# Patient Record
Sex: FEMALE | Race: WHITE | Employment: FULL TIME | URBAN - METROPOLITAN AREA
[De-identification: names, ages, dates, MRNs, and addresses within clinical notes are randomized per-mention and may not be internally consistent; named-entity substitution may affect disease eponyms.]

---

## 2017-01-16 DIAGNOSIS — F41.1 GENERALIZED ANXIETY DISORDER: ICD-10-CM

## 2017-01-16 RX ORDER — CLONAZEPAM 0.5 MG/1
0.5 TABLET ORAL
Qty: 30 TAB | Refills: 0 | Status: SHIPPED | OUTPATIENT
Start: 2017-01-20 | End: 2017-02-17 | Stop reason: ALTCHOICE

## 2017-01-16 NOTE — TELEPHONE ENCOUNTER
She is almost a week early. I will fill this today but it is post-dated to be filled when due, not today.

## 2017-02-13 DIAGNOSIS — F41.1 GENERALIZED ANXIETY DISORDER: ICD-10-CM

## 2017-02-13 RX ORDER — CLONAZEPAM 0.5 MG/1
0.5 TABLET ORAL
Qty: 30 TAB | Refills: 0 | OUTPATIENT
Start: 2017-02-13

## 2017-02-17 ENCOUNTER — OFFICE VISIT (OUTPATIENT)
Dept: INTERNAL MEDICINE CLINIC | Age: 47
End: 2017-02-17

## 2017-02-17 VITALS
BODY MASS INDEX: 24.11 KG/M2 | RESPIRATION RATE: 20 BRPM | HEIGHT: 66 IN | HEART RATE: 78 BPM | WEIGHT: 150 LBS | TEMPERATURE: 97 F | DIASTOLIC BLOOD PRESSURE: 72 MMHG | OXYGEN SATURATION: 98 % | SYSTOLIC BLOOD PRESSURE: 110 MMHG

## 2017-02-17 DIAGNOSIS — E03.9 ACQUIRED HYPOTHYROIDISM: ICD-10-CM

## 2017-02-17 DIAGNOSIS — F41.1 GENERALIZED ANXIETY DISORDER: Primary | ICD-10-CM

## 2017-02-17 RX ORDER — CLONAZEPAM 0.5 MG/1
0.5 TABLET ORAL
Qty: 30 TAB | Refills: 0 | Status: SHIPPED | OUTPATIENT
Start: 2017-02-18 | End: 2017-03-16 | Stop reason: SDUPTHER

## 2017-02-17 RX ORDER — VILAZODONE HYDROCHLORIDE 10 MG/1
10 TABLET ORAL DAILY
Qty: 30 TAB | Refills: 0 | Status: SHIPPED | OUTPATIENT
Start: 2017-02-17 | End: 2017-03-19

## 2017-02-17 NOTE — PROGRESS NOTES
Chief Complaint   Patient presents with    Medication Refill     wants to know what to do to decrease stress in regards to refilling her klonipin. 1. Have you been to the ER, urgent care clinic since your last visit? Hospitalized since your last visit? No    2. Have you seen or consulted any other health care providers outside of the [address] [place] [address] since your last visit? Include any pap smears or colon screening.  No

## 2017-02-17 NOTE — PROGRESS NOTES
Written by Winn Scheuermann, as dictated by Dr. Melvin Celis MD.    Branden Mcghee is a 48 y.o. female. HPI  The patient is here for a klonopin refill. She has called he  for her refill and she called in the office early to get the script prepared. She has followed with a psychologist in the past, but she has never followed with a psychiatrist. She has tried wellbutrin in the past and it did not work well for her. She is a single mom , working full time , under lot of stress. She takes synthroid 50 mcg, but she has not had her TSH levels checked in a while. 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 30 Tab 0    levothyroxine (SYNTHROID) 50 mcg tablet Take 1 Tab by mouth Daily (before breakfast). 30 Tab 6    ferrous sulfate 325 mg (65 mg iron) tablet Take 1 Tab by mouth Daily (before breakfast). 30 Tab 3     No current facility-administered medications on file prior to visit.         No Known Allergies    Past Medical History   Diagnosis Date    Anxiety     Ill-defined condition      Low BP at times    Nausea & vomiting     Psychiatric disorder      anxiety at times    Thyroid disease        Past Surgical History   Procedure Laterality Date    Hx breast reduction  1992    Hx orthopaedic Left Jun 17, 2014     Left Should dislocation, And repair       Family History   Problem Relation Age of Onset    Alcohol abuse Father     Diabetes Father     Hypertension Father     Anxiety Sister     Depression Sister     Diabetes Paternal Grandmother        Social History     Social History    Marital status: LEGALLY      Spouse name: N/A    Number of children: N/A    Years of education: N/A     Occupational History    Not on file. Social History Main Topics    Smoking status: Never Smoker    Smokeless tobacco: Never Used    Alcohol use Yes      Comment: occasional    Drug use: No    Sexual activity: Yes     Partners: Male     Other Topics Concern    Not on file     Social History Narrative           Review of Systems   Constitutional: Negative for malaise/fatigue. HENT: Negative for congestion. Respiratory: Negative for cough and wheezing. Cardiovascular: Negative for chest pain and palpitations. Musculoskeletal: Negative for joint pain and myalgias. Neurological: Negative for weakness and headaches. Psychiatric/Behavioral: Negative for depression and suicidal ideas. The patient is nervous/anxious. Visit Vitals    /72    Pulse 78    Temp 97 °F (36.1 °C) (Oral)    Resp 20    Ht 5' 6\" (1.676 m)    Wt 150 lb (68 kg)    LMP 02/02/2017    SpO2 98%    BMI 24.21 kg/m2     Physical Exam   Constitutional: She is oriented to person, place, and time. She appears well-nourished. No distress. HENT:   Right Ear: External ear normal.   Left Ear: External ear normal.   Mouth/Throat: Oropharynx is clear and moist.   Eyes: Conjunctivae and EOM are normal. Right eye exhibits no discharge. Left eye exhibits no discharge. Neck: Normal range of motion. Neck supple. Cardiovascular: Normal rate and regular rhythm. Pulmonary/Chest: Effort normal and breath sounds normal.   Abdominal: Soft. Bowel sounds are normal.   Neurological: She is alert and oriented to person, place, and time. Skin: Skin is intact. Psychiatric: She has a normal mood and affect. Nursing note and vitals reviewed. ASSESSMENT and PLAN    ICD-10-CM ICD-9-CM    1. Generalized anxiety disorder F41.1 300.02 clonazePAM (KLONOPIN) 0.5 mg tablet script given to patient.        vilazodone (VIIBRYD) 10 mg tab tablet sent to pharmacy      I want her to try Viibryd and I want her to take 10 mg for 5-6 days to try to get off of Klonopin. I will give her a Klonopin refill today. If her viibryd is not working then we will refer her to psychiatrist.   2. Acquired hypothyroidism E03.9 244.9 TSH 3RD GENERATION      LIPID PANEL      METABOLIC PANEL, COMPREHENSIVE      CBC W/O DIFF    I want her to come in on Wednesday fasting so that we can check her TSH, Lipid panel and iron levels. This plan was reviewed with the patient and patient agrees. All questions were answered. This scribe documentation was reviewed by me and accurately reflects the examination and decisions made by me. This note will not be viewable in [address]

## 2017-02-17 NOTE — MR AVS SNAPSHOT
Visit Information Date & Time Provider Department Dept. Phone Encounter #  
 2/17/2017  1:00 PM Vada Bumpers, [address] [facility],[address] Internal Medicine [phone] [id number] Upcoming Health Maintenance Date Due DTaP/Tdap/Td series (1 - Tdap) 12/21/1989 PAP AKA CERVICAL CYTOLOGY 12/21/1989 INFLUENZA AGE 9 TO ADULT 8/1/2016 Allergies as of 2/17/2017  Review Complete On: 2/17/2017 By: Betzy Pan LPN No Known Allergies Current Immunizations  Never Reviewed No immunizations on file. Not reviewed this visit You Were Diagnosed With   
  
 Codes Comments Generalized anxiety disorder    -  Primary ICD-10-CM: F41.1 ICD-9-CM: 300.02 Acquired hypothyroidism     ICD-10-CM: E03.9 ICD-9-CM: 795. 9 Vitals BP Pulse Temp Resp Height(growth percentile) Weight(growth percentile) 110/72 78 97 °F (36.1 °C) (Oral) 20 5' 6\" (1.676 m) 150 lb (68 kg) LMP SpO2 BMI OB Status Smoking Status 02/02/2017 98% 24.21 kg/m2 Having regular periods Never Smoker BMI and BSA Data Body Mass Index Body Surface Area  
 24.21 kg/m 2 1.78 m 2 Preferred Pharmacy Pharmacy Name Phone [id number], [address] [place] [address] [phone] Your Updated Medication List  
  
   
This list is accurate as of: 2/17/17  1:53 PM.  Always use your most recent med list.  
  
  
  
  
 clonazePAM 0.5 mg tablet Commonly known as:  Imlay Osman Take 1 Tab by mouth nightly as needed for up to 30 doses. Max Daily Amount: 0.5 mg.  
Start taking on:  2/18/2017  
  
 ferrous sulfate 325 mg (65 mg iron) tablet Take 1 Tab by mouth Daily (before breakfast). levothyroxine 50 mcg tablet Commonly known as:  SYNTHROID Take 1 Tab by mouth Daily (before breakfast). vilazodone 10 mg Tab tablet Commonly known as:  VIIBRYD Take 1 Tab by mouth daily for 30 days. Prescriptions Printed Refills clonazePAM (KLONOPIN) 0.5 mg tablet 0 Starting on: 2/18/2017 Sig: Take 1 Tab by mouth nightly as needed for up to 30 doses. Max Daily Amount: 0.5 mg.  
 Class: Print Route: Oral  
 vilazodone (VIIBRYD) 10 mg tab tablet 0 Sig: Take 1 Tab by mouth daily for 30 days. Class: Print Route: Oral  
  
To-Do List   
 02/22/2017 Lab:  CBC W/O DIFF   
  
 02/22/2017 Lab:  LIPID PANEL   
  
 02/22/2017 Lab:  METABOLIC PANEL, COMPREHENSIVE   
  
 02/22/2017 Lab:  TSH 3RD GENERATION Introducing [facility]! Drew Green introduces [employer] patient portal. Now you can access parts of your medical record, email your doctor's office, and request medication refills online. 1. In your internet browser, go to [email or web address] 2. Click on the First Time User? Click Here link in the Sign In box. You will see the New Member Sign Up page. 3. Enter your [employer] Access Code exactly as it appears below. You will not need to use this code after youve completed the sign-up process. If you do not sign up before the expiration date, you must request a new code. · [employer] Access Code: [email or web address] Expires: 5/18/2017  1:22 PM 
 
4. Enter the last four digits of your Social Security Number (xxxx) and Date of Birth (mm/dd/yyyy) as indicated and click Submit. You will be taken to the next sign-up page. 5. Create a [employer] ID. This will be your [employer] login ID and cannot be changed, so think of one that is secure and easy to remember. 6. Create a [employer] password. You can change your password at any time. 7. Enter your Password Reset Question and Answer. This can be used at a later time if you forget your password. 8. Enter your e-mail address. You will receive e-mail notification when new information is available in [address] 9. Click Sign Up. You can now view and download portions of your medical record. 10. Click the Download Summary menu link to download a portable copy of your medical information. If you have questions, please visit the Frequently Asked Questions section of the [employer] website. Remember, [employer] is NOT to be used for urgent needs. For medical emergencies, dial 911. Now available from your iPhone and Android! Please provide this summary of care documentation to your next provider. Your primary care clinician is listed as Nancy Sinha. If you have any questions after today's visit, please call [phone].

## 2017-02-24 ENCOUNTER — DOCUMENTATION ONLY (OUTPATIENT)
Dept: INTERNAL MEDICINE CLINIC | Age: 47
End: 2017-02-24

## 2017-03-16 DIAGNOSIS — F41.1 GENERALIZED ANXIETY DISORDER: ICD-10-CM

## 2017-03-16 NOTE — TELEPHONE ENCOUNTER
Last refill 2/17/17  Last office visit 2/17/17    She was to wean off Klonipin last visit and start Viibryd. Jeremias Sue was approved on 2/24/17.

## 2017-03-17 RX ORDER — CLONAZEPAM 0.5 MG/1
0.5 TABLET ORAL
Qty: 30 TAB | Refills: 0 | Status: SHIPPED | OUTPATIENT
Start: 2017-03-17 | End: 2017-04-12 | Stop reason: SDUPTHER

## 2017-04-12 DIAGNOSIS — F41.1 GENERALIZED ANXIETY DISORDER: ICD-10-CM

## 2017-04-13 ENCOUNTER — DOCUMENTATION ONLY (OUTPATIENT)
Dept: INTERNAL MEDICINE CLINIC | Age: 47
End: 2017-04-13

## 2017-04-13 RX ORDER — CLONAZEPAM 0.5 MG/1
0.5 TABLET ORAL
Qty: 30 TAB | Refills: 0 | Status: SHIPPED | OUTPATIENT
Start: 2017-04-13 | End: 2017-05-10 | Stop reason: SDUPTHER

## 2017-05-10 ENCOUNTER — OFFICE VISIT (OUTPATIENT)
Dept: INTERNAL MEDICINE CLINIC | Age: 47
End: 2017-05-10

## 2017-05-10 VITALS
TEMPERATURE: 98 F | BODY MASS INDEX: 23.63 KG/M2 | HEIGHT: 66 IN | SYSTOLIC BLOOD PRESSURE: 118 MMHG | DIASTOLIC BLOOD PRESSURE: 62 MMHG | WEIGHT: 147 LBS | HEART RATE: 67 BPM | RESPIRATION RATE: 20 BRPM | OXYGEN SATURATION: 99 %

## 2017-05-10 DIAGNOSIS — E03.9 ACQUIRED HYPOTHYROIDISM: Primary | ICD-10-CM

## 2017-05-10 DIAGNOSIS — F41.1 GENERALIZED ANXIETY DISORDER: ICD-10-CM

## 2017-05-10 RX ORDER — CLONAZEPAM 0.5 MG/1
0.5 TABLET ORAL
Qty: 30 TAB | Refills: 0 | Status: SHIPPED | OUTPATIENT
Start: 2017-05-10 | End: 2017-06-06 | Stop reason: SDUPTHER

## 2017-05-10 RX ORDER — CLONAZEPAM 0.5 MG/1
0.5 TABLET ORAL
Qty: 30 TAB | Refills: 0 | Status: CANCELLED | OUTPATIENT
Start: 2017-05-10

## 2017-05-10 RX ORDER — VILAZODONE HYDROCHLORIDE 20 MG/1
20 TABLET ORAL DAILY
Qty: 30 TAB | Refills: 2 | Status: SHIPPED | OUTPATIENT
Start: 2017-05-10 | End: 2017-08-08

## 2017-05-10 NOTE — PROGRESS NOTES
Written by Ainsley Salazar, as dictated by Dr. Yolanda Sanchez MD.    Miki Montes is a 48 y.o. female. HPI  The patient comes in today for a medication evaluation. Hallie Dials was too expensive for her so she never got prescription filled. She is still having frequent anxiety attacks. She needs a refill of her Klonopin. She needs the medication daily. Her anxiety is increased recently because of a lots of things happening  in her family. She has had  tried Lexapro and Buspar, but they did not work for her. Lexapro gave her severe  nausea. She did take Klonopin more than once a day in last few days as she has been going through lot of stress at home. 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 Max Daily Amount: 0.5 mg. 30 Tab 0    levothyroxine (SYNTHROID) 50 mcg tablet Take 1 Tab by mouth Daily (before breakfast). 30 Tab 6    ferrous sulfate 325 mg (65 mg iron) tablet Take 1 Tab by mouth Daily (before breakfast). 30 Tab 3     No current facility-administered medications on file prior to visit.         No Known Allergies    Past Medical History:   Diagnosis Date    Anxiety     Ill-defined condition     Low BP at times    Nausea & vomiting     Psychiatric disorder     anxiety at times    Thyroid disease        Past Surgical History:   Procedure Laterality Date    HX BREAST REDUCTION  1992    HX ORTHOPAEDIC Left Jun 17, 2014    Left Should dislocation, And repair       Family History   Problem Relation Age of Onset    Alcohol abuse Father     Diabetes Father     Hypertension Father     Anxiety Sister     Depression Sister     Diabetes Paternal Grandmother        Social History Social History    Marital status: LEGALLY      Spouse name: N/A    Number of children: N/A    Years of education: N/A     Occupational History    Not on file. Social History Main Topics    Smoking status: Never Smoker    Smokeless tobacco: Never Used    Alcohol use Yes      Comment: occasional    Drug use: No    Sexual activity: Yes     Partners: Male     Other Topics Concern    Not on file     Social History Narrative           Review of Systems   Constitutional: Negative for malaise/fatigue. HENT: Negative for congestion. Respiratory: Negative for cough and wheezing. Cardiovascular: Negative for chest pain and palpitations. Musculoskeletal: Negative for joint pain and myalgias. Neurological: Negative for weakness and headaches. Psychiatric/Behavioral: Negative for depression and suicidal ideas. The patient is nervous/anxious. Visit Vitals    /62    Pulse 67    Temp 98 °F (36.7 °C) (Oral)    Resp 20    Ht 5' 6\" (1.676 m)    Wt 147 lb (66.7 kg)    LMP 04/25/2017 (Exact Date)    SpO2 99%    BMI 23.73 kg/m2     Physical Exam   Constitutional: She is oriented to person, place, and time. She appears well-nourished. No distress. HENT:   Right Ear: External ear normal.   Left Ear: External ear normal.   Mouth/Throat: Oropharynx is clear and moist.   Eyes: Conjunctivae and EOM are normal. Right eye exhibits no discharge. Left eye exhibits no discharge. Neck: Normal range of motion. Neck supple. Cardiovascular: Normal rate and regular rhythm. Pulmonary/Chest: Effort normal and breath sounds normal. She has no wheezes. Abdominal: Soft. Bowel sounds are normal. She exhibits no distension. Lymphadenopathy:     She has no cervical adenopathy. Neurological: She is alert and oriented to person, place, and time. Skin: Skin is intact. Psychiatric: She has a normal mood and affect. Nursing note and vitals reviewed.       ASSESSMENT and PLAN    ICD-10-CM ICD-9-CM    1. Acquired hypothyroidism E03.9 244.9 LIPID PANEL      CBC W/O DIFF      METABOLIC PANEL, COMPREHENSIVE      TSH 3RD GENERATION      VITAMIN D, 25 HYDROXY    Pt will return during lab hours to have basic fasting labs drawn. 2. Generalized anxiety disorder F41.1 300.02 vilazodone (VIIBRYD) 20 mg tab tablet sent to pharmacy       REFERRAL TO PSYCHIATRY      clonazePAM (KLONOPIN) 0.5 mg tablet script given to patient.  reviewed. I will give her a new script of the Viibryd  and  we can do her prior authorization for her medication. I will put a referral for psychiatrist and then she can follow with them for her klonopin. I will give her a few pills now until she can follow with a psychiatrist.       This plan was reviewed with the patient and patient agrees. All questions were answered. This scribe documentation was reviewed by me and accurately reflects the examination and decisions made by me. This note will not be viewable in [address]

## 2017-05-10 NOTE — PROGRESS NOTES
Chief Complaint   Patient presents with    Medication Refill     anxiety    Medication Evaluation     has been unable to get the vibryd due to cost.  will look into other saving option. 1. Have you been to the ER, urgent care clinic since your last visit? Hospitalized since your last visit? No    2. Have you seen or consulted any other health care providers outside of the [address] [place] [address] since your last visit? Include any pap smears or colon screening.  No

## 2017-06-06 DIAGNOSIS — F41.1 GENERALIZED ANXIETY DISORDER: ICD-10-CM

## 2017-06-09 RX ORDER — CLONAZEPAM 0.5 MG/1
0.5 TABLET ORAL
Qty: 30 TAB | Refills: 0 | Status: SHIPPED | OUTPATIENT
Start: 2017-06-09 | End: 2017-07-12 | Stop reason: SDUPTHER

## 2017-07-12 DIAGNOSIS — F41.1 GENERALIZED ANXIETY DISORDER: ICD-10-CM

## 2017-07-12 RX ORDER — CLONAZEPAM 0.5 MG/1
0.5 TABLET ORAL
Qty: 30 TAB | Refills: 0 | Status: SHIPPED | OUTPATIENT
Start: 2017-07-12

## 2017-09-28 DIAGNOSIS — E03.9 ACQUIRED HYPOTHYROIDISM: ICD-10-CM

## 2017-09-28 RX ORDER — LEVOTHYROXINE SODIUM 50 UG/1
TABLET ORAL
Qty: 30 TAB | Refills: 2 | Status: SHIPPED | OUTPATIENT
Start: 2017-09-28 | End: 2017-10-30 | Stop reason: SDUPTHER

## 2017-10-30 DIAGNOSIS — E03.9 ACQUIRED HYPOTHYROIDISM: ICD-10-CM

## 2017-10-30 NOTE — TELEPHONE ENCOUNTER
90 day supply    Last Refill: 9/28/2017  Last Lab: Metabolic Panel 42/89/4740, 4/22/2014  Last OV: 5/10/2017

## 2017-11-01 RX ORDER — LEVOTHYROXINE SODIUM 50 UG/1
TABLET ORAL
Qty: 90 TAB | Refills: 0 | Status: SHIPPED | OUTPATIENT
Start: 2017-11-01 | End: 2018-03-03 | Stop reason: SDUPTHER

## 2019-04-24 DIAGNOSIS — E03.9 ACQUIRED HYPOTHYROIDISM: Primary | ICD-10-CM

## 2019-04-24 RX ORDER — LEVOTHYROXINE SODIUM 50 UG/1
50 TABLET ORAL
Qty: 30 TAB | Refills: 6 | Status: CANCELLED | OUTPATIENT
Start: 2019-04-24

## 2019-04-24 NOTE — TELEPHONE ENCOUNTER
Last office visit 5/10/2017  Last med refill 3/5/2018  Last lab 12/4/2015    This patient has not been seen since 2017 in this office

## 2021-09-20 ENCOUNTER — HOSPITAL ENCOUNTER (OUTPATIENT)
Dept: GENERAL RADIOLOGY | Age: 51
Discharge: HOME | End: 2021-09-20
Attending: FAMILY MEDICINE
Payer: COMMERCIAL

## 2021-09-20 ENCOUNTER — TRANSCRIBE ORDER (OUTPATIENT)
Dept: GENERAL RADIOLOGY | Age: 51
End: 2021-09-20

## 2021-09-20 DIAGNOSIS — R07.9 CHEST PAIN: Primary | ICD-10-CM

## 2021-09-20 DIAGNOSIS — R07.9 CHEST PAIN: ICD-10-CM

## 2021-09-20 PROCEDURE — 71046 X-RAY EXAM CHEST 2 VIEWS: CPT

## 2023-05-10 RX ORDER — CLONAZEPAM 0.5 MG/1
TABLET ORAL
COMMUNITY
Start: 2017-07-12

## 2023-05-10 RX ORDER — LEVOTHYROXINE SODIUM 0.05 MG/1
1 TABLET ORAL
COMMUNITY
Start: 2016-06-24

## 2023-05-10 RX ORDER — FERROUS SULFATE 325(65) MG
TABLET ORAL
COMMUNITY
Start: 2012-10-03